# Patient Record
Sex: FEMALE | Race: WHITE | NOT HISPANIC OR LATINO | Employment: PART TIME | URBAN - METROPOLITAN AREA
[De-identification: names, ages, dates, MRNs, and addresses within clinical notes are randomized per-mention and may not be internally consistent; named-entity substitution may affect disease eponyms.]

---

## 2018-10-08 ENCOUNTER — EVALUATION (OUTPATIENT)
Dept: PHYSICAL THERAPY | Facility: CLINIC | Age: 43
End: 2018-10-08
Payer: COMMERCIAL

## 2018-10-08 DIAGNOSIS — M54.16 RADICULOPATHY, LUMBAR REGION: Primary | ICD-10-CM

## 2018-10-08 PROCEDURE — 97161 PT EVAL LOW COMPLEX 20 MIN: CPT

## 2018-10-08 PROCEDURE — G8978 MOBILITY CURRENT STATUS: HCPCS

## 2018-10-08 PROCEDURE — G8979 MOBILITY GOAL STATUS: HCPCS

## 2018-10-08 NOTE — LETTER
2018    Clyde Rutledge MD  [address] [place] [id number]    Patient: Leticia Posey   YOB: 1975   Date of Visit: 10/8/2018     Encounter Diagnosis     ICD-10-CM    1  Radiculopathy, lumbar region M54 16        Dear Dr Marino Scales:    Please review the attached Plan of Care from Suzi Sexton's recent visit  Please verify that you agree therapy should continue by signing the attached document and sending it back to our office  If you have any questions or concerns, please don't hesitate to call  Sincerely,    Romy Arboleda, PT      Referring Provider:      I certify that I have read the below Plan of Care and certify the need for these services furnished under this plan of treatment while under my care  Clyde Rutledge MD  [address] [place] [id number]  VIA Facsimile: [phone]          PT Evaluation     Today's date: 10/8/2018  Patient name: Leticia Posey  : 1975  MRN: [id number]  Referring provider: Sara Forbes MD  Dx:   Encounter Diagnosis     ICD-10-CM    1  Radiculopathy, lumbar region M54 16        Start Time: 1130  Stop Time: 1215  Total time in clinic (min): 45 minutes    Assessment  Impairments: abnormal muscle firing, abnormal or restricted ROM, activity intolerance, impaired physical strength, lacks appropriate home exercise program, pain with function, poor posture  and poor body mechanics  Functional limitations: Prolonged sitting/driving, forward bending/lifting  Assessment details: Leticia Posey is a 37 y o  female who presents with pain, decreased strength, decreased ROM, decreased joint mobility and postural  dysfunction  Due to these impairments, patient has difficulty performing ADL's, recreational activities, lifting/carrying, transfers  Patient's clinical presentation is consistent with their referring diagnosis of Radiculopathy, lumbar region  (primary encounter diagnosis)   Patient has been educated in home exercise program and plan of care  Patient would benefit from skilled physical therapy services to address their aforementioned functional limitations and progress towards prior level of function and independence with home exercise program      Understanding of Dx/Px/POC: good   Prognosis: good    Goals  Short Term Goals: Target Date 10/29/18  1  Initiate and advance HEP  2  Increase trunk flexion AROM to </= 25% limitation  3  Increase bilateral LE MMT by at least 1/2 grade  4  Increase sitting tolerance to 20 min without increased symptoms  5  Pt will be able to squat to approx 90 deg knee flex using proper form with minimal PT cuing            Long Term Goals: Target Date 11/19/18  1  Indep with HEP  2  Increase trunk flexion AROM to WNL  3  Increase bilateral LE MMT to at least 4+/5 in all planes  4  Increase sitting tolerance to >30 min without increased symptoms  5  Pt will be able to demonstrate a full functional squat without PT cuing  6   Pt will be able to return to PLOF without limitation      Plan  Patient would benefit from: skilled PT  Planned modality interventions: cryotherapy, electrical stimulation/Russian stimulation and thermotherapy: hydrocollator packs  Planned therapy interventions: joint mobilization, manual therapy, patient education, postural training, activity modification, abdominal trunk stabilization, body mechanics training, flexibility, functional ROM exercises, graded exercise, home exercise program, neuromuscular re-education, strengthening, stretching, therapeutic activities, therapeutic exercise, motor coordination training, muscle pump exercises, gait training, balance/weight bearing training and ADL training  Frequency: 2x week  Duration in weeks: 6  Plan of Care beginning date: 10/8/2018  Plan of Care expiration date: 11/19/2018  Treatment plan discussed with: patient        Subjective Evaluation    History of Present Illness  Mechanism of injury: Pt reports of an approx 1 year history of R sided sciatica that she experiences with prolonged sitting, escpecially while driving  States she has similar symptoms once when pregnant and again a few years ago, but that they had resolved on their own  She reports over the summer, she began experiencing L sided LBP that would prevent her from standing upright and ambulating community distances  These symptoms remains after a few weeks, causing her to see the MD who performed x-rays (neg other than increased lumbar lordosis) and prescribed PT  Pt reports during that time, she began stretching and has noticed a marked improvement in L LBP, although the R sciatica remains  Quality of life: good    Pain  Current pain rating: 3  At best pain ratin  At worst pain ratin  Location: R glute (Can radiate to the ankle)  Quality: dull ache, radiating and sharp  Relieving factors: medications (Standing)  Aggravating factors: sitting (Driving, forward bending/lifting)  Progression: no change      Diagnostic Tests  X-ray: abnormal (Slight increased lordosis)  Treatments  Current treatment: physical therapy  Patient Goals  Patient goals for therapy: independence with ADLs/IADLs and decreased pain  Patient goal: Return to exercise classes        Objective     Special Questions  Negative for bladder dysfunction and bowel dysfunction    Static Posture     Lumbar Spine   Increased lordosis  Palpation   Left   Hypertonic in the piriformis  Tenderness of the gluteus rhonda and piriformis  Right   Hypertonic in the piriformis and quadratus lumborum  Tenderness of the gluteus rhonda, piriformis and quadratus lumborum  Tenderness     Left Hip   Tenderness in the PSIS  Right Hip   Tenderness in the PSIS       Neurological Testing     Sensation     Lumbar   Left   Intact: light touch    Right   Intact: light touch    Active Range of Motion     Additional Active Range of Motion Details  Lumbar  Flex: Fingertips to mid tibia, increase stretch/tension  Ext: WFL, no change  R Lat Flex: WFL, L sided tightness  L Lateral Flex: WFL, R sided tightness  R Rotation: WFL, L sided tightness  L Rotation: 25% limitation, increased SIJ soreness      Passive Range of Motion   Left Hip   Flexion: [facility] [place]  External rotation (90/90): 75 degrees   Internal rotation (90/90): [facility] [place]  Internal rotation (prone): WFL    Right Hip   Flexion: [facility] [place]  External rotation (90/90): 50 degrees with pain  Internal rotation (90/90): [facility] [place]  Internal rotation (prone): [facility]    Strength/Myotome Testing     Left Hip   Planes of Motion   Flexion: 4  Extension: 4+  Abduction: 4-  Adduction: 4    Right Hip   Planes of Motion   Flexion: 4  Extension: 4+  Abduction: 4-  Adduction: 4    Left Knee   Flexion: 4+  Extension: 5    Right Knee   Flexion: 4+  Extension: 5    Left Ankle/Foot   Normal strength    Right Ankle/Foot   Normal strength    Muscle Activation   Patient able to activate left transverse abdominals and right transverse abdominals  Tests     Lumbar     Left   Positive crossed SLR  Right   Positive crossed SLR  Left Hip   Modified Alex: Positive  90/90 SLR: Positive  Right Hip   Modified Alex: Positive  90/90 SLR: Positive  Ambulation     Ambulation: Stairs   Pattern: reciprocal  Pattern: reciprocal    Observational Gait   Gait: within functional limits     Additional Observational Gait Details  Pt ambulates with a mostly normalized gait pattern other than slight decrease in ann marie trunk/hip rotation  Denies c/o    Functional Assessment     Single Leg Stance   Left: 30 seconds  Right: 30 seconds    Comments  Squat:   Pt is able to squat to approx 105 deg knee flexion demonstrating increased trunk flexion and a narrow BEENA    Pt denies c/o      Flowsheet Rows      Most Recent Value   PT/OT G-Codes   Current Score  34   Projected Score  59   Assessment Type  Evaluation   G code set  Mobility: Walking & Moving Around   Mobility: Walking and Moving Around Current Status ()  CL   Mobility: Walking and Moving Around Goal Status ()  CK          Precautions: [id number]    Specialty Daily Treatment Diary       Manual 10/8/18       STM        L/S p-a mobs        Man Flexibility        MET                        Exercise Diary         Rec bike        TA activation 10x5"       Ball squeeze 10x5"       SLR abd x10 ann marie       Sciatic nerve glides Supine 10x10" ann marie       Bridges 15x5"       Piriformis stretch 3x30" ann marie                                                                                       Modalities

## 2018-10-08 NOTE — PROGRESS NOTES
PT Evaluation     Today's date: 10/8/2018  Patient name: Celeste Carson  : 1975  MRN: [id number]  Referring provider: Mikey Johnson MD  Dx:   Encounter Diagnosis     ICD-10-CM    1  Radiculopathy, lumbar region M54 16        Start Time: 1130  Stop Time: 1215  Total time in clinic (min): 45 minutes    Assessment  Impairments: abnormal muscle firing, abnormal or restricted ROM, activity intolerance, impaired physical strength, lacks appropriate home exercise program, pain with function, poor posture  and poor body mechanics  Functional limitations: Prolonged sitting/driving, forward bending/lifting  Assessment details: Celeste Carson is a 37 y o  female who presents with pain, decreased strength, decreased ROM, decreased joint mobility and postural  dysfunction  Due to these impairments, patient has difficulty performing ADL's, recreational activities, lifting/carrying, transfers  Patient's clinical presentation is consistent with their referring diagnosis of Radiculopathy, lumbar region  (primary encounter diagnosis)  Patient has been educated in home exercise program and plan of care  Patient would benefit from skilled physical therapy services to address their aforementioned functional limitations and progress towards prior level of function and independence with home exercise program      Understanding of Dx/Px/POC: good   Prognosis: good    Goals  Short Term Goals: Target Date 10/29/18  1  Initiate and advance HEP  2  Increase trunk flexion AROM to </= 25% limitation  3  Increase bilateral LE MMT by at least 1/2 grade  4  Increase sitting tolerance to 20 min without increased symptoms  5  Pt will be able to squat to approx 90 deg knee flex using proper form with minimal PT cuing            Long Term Goals: Target Date 18  1  Indep with HEP  2  Increase trunk flexion AROM to WNL  3  Increase bilateral LE MMT to at least 4+/5 in all planes  4  Increase sitting tolerance to >30 min without increased symptoms  5  Pt will be able to demonstrate a full functional squat without PT cuing  6  Pt will be able to return to PLOF without limitation      Plan  Patient would benefit from: skilled PT  Planned modality interventions: cryotherapy, electrical stimulation/Russian stimulation and thermotherapy: hydrocollator packs  Planned therapy interventions: joint mobilization, manual therapy, patient education, postural training, activity modification, abdominal trunk stabilization, body mechanics training, flexibility, functional ROM exercises, graded exercise, home exercise program, neuromuscular re-education, strengthening, stretching, therapeutic activities, therapeutic exercise, motor coordination training, muscle pump exercises, gait training, balance/weight bearing training and ADL training  Frequency: 2x week  Duration in weeks: 6  Plan of Care beginning date: 10/8/2018  Plan of Care expiration date: 2018  Treatment plan discussed with: patient        Subjective Evaluation    History of Present Illness  Mechanism of injury: Pt reports of an approx 1 year history of R sided sciatica that she experiences with prolonged sitting, escpecially while driving  States she has similar symptoms once when pregnant and again a few years ago, but that they had resolved on their own  She reports over the summer, she began experiencing L sided LBP that would prevent her from standing upright and ambulating community distances  These symptoms remains after a few weeks, causing her to see the MD who performed x-rays (neg other than increased lumbar lordosis) and prescribed PT  Pt reports during that time, she began stretching and has noticed a marked improvement in L LBP, although the R sciatica remains      Quality of life: good    Pain  Current pain rating: 3  At best pain ratin  At worst pain ratin  Location: R glute (Can radiate to the ankle)  Quality: dull ache, radiating and sharp  Relieving factors: medications (Standing)  Aggravating factors: sitting (Driving, forward bending/lifting)  Progression: no change      Diagnostic Tests  X-ray: abnormal (Slight increased lordosis)  Treatments  Current treatment: physical therapy  Patient Goals  Patient goals for therapy: independence with ADLs/IADLs and decreased pain  Patient goal: Return to exercise classes        Objective     Special Questions  Negative for bladder dysfunction and bowel dysfunction    Static Posture     Lumbar Spine   Increased lordosis  Palpation   Left   Hypertonic in the piriformis  Tenderness of the gluteus rhonda and piriformis  Right   Hypertonic in the piriformis and quadratus lumborum  Tenderness of the gluteus rhonda, piriformis and quadratus lumborum  Tenderness     Left Hip   Tenderness in the PSIS  Right Hip   Tenderness in the PSIS  Neurological Testing     Sensation     Lumbar   Left   Intact: light touch    Right   Intact: light touch    Active Range of Motion     Additional Active Range of Motion Details  Lumbar  Flex: Fingertips to mid tibia, increase stretch/tension  Ext: WFL, no change  R Lat Flex: WFL, L sided tightness  L Lateral Flex: WFL, R sided tightness  R Rotation: WFL, L sided tightness  L Rotation: 25% limitation, increased SIJ soreness      Passive Range of Motion   Left Hip   Flexion: [facility] [place]  External rotation (90/90): 75 degrees   Internal rotation (90/90): [facility] [place]  Internal rotation (prone): WFL    Right Hip   Flexion: [facility] [place]  External rotation (90/90): 50 degrees with pain  Internal rotation (90/90):  [facility] [place]  [facility]    Strength/Myotome Testing     Left Hip   Planes of Motion   Flexion: 4  Extension: 4+  Abduction: 4-  Adduction: 4    Right Hip   Planes of Motion   Flexion: 4  Extension: 4+  Abduction: 4-  Adduction: 4    Left Knee   Flexion: 4+  Extension: 5    Right Knee   Flexion: 4+  Extension: 5    Left Ankle/Foot   Normal strength    Right Ankle/Foot   Normal strength    Muscle Activation   Patient able to activate left transverse abdominals and right transverse abdominals  Tests     Lumbar     Left   Positive crossed SLR  Right   Positive crossed SLR  Left Hip   Modified Aelx: Positive  90/90 SLR: Positive  Right Hip   Modified Alex: Positive  90/90 SLR: Positive  Ambulation     Ambulation: Stairs   Pattern: reciprocal  Pattern: reciprocal    Observational Gait   Gait: within functional limits     Additional Observational Gait Details  Pt ambulates with a mostly normalized gait pattern other than slight decrease in ann marie trunk/hip rotation  Denies c/o    Functional Assessment     Single Leg Stance   Left: 30 seconds  Right: 30 seconds    Comments  Squat:   Pt is able to squat to approx 105 deg knee flexion demonstrating increased trunk flexion and a narrow BEENA    Pt denies c/o      Flowsheet Rows      Most Recent Value   PT/OT G-Codes   Current Score  34   Projected Score  59   Assessment Type  Evaluation   G code set  Mobility: Walking & Moving Around   Mobility: Walking and Moving Around Current Status ()  CL   Mobility: Walking and Moving Around Goal Status ()  CK          Precautions: [id number]/8    Specialty Daily Treatment Diary       Manual 10/8/18       STM        L/S p-a mobs        Man Flexibility        MET                        Exercise Diary         Rec bike        TA activation 10x5"       Ball squeeze 10x5"       SLR abd x10 ann marie       Sciatic nerve glides Supine 10x10" ann marie       Bridges 15x5"       Piriformis stretch 3x30" ann marie                                                                                       Modalities

## 2018-10-09 ENCOUNTER — TRANSCRIBE ORDERS (OUTPATIENT)
Dept: PHYSICAL THERAPY | Facility: CLINIC | Age: 43
End: 2018-10-09

## 2018-10-09 DIAGNOSIS — M54.16 LUMBAR RADICULOPATHY: Primary | ICD-10-CM

## 2018-10-15 ENCOUNTER — OFFICE VISIT (OUTPATIENT)
Dept: PHYSICAL THERAPY | Facility: CLINIC | Age: 43
End: 2018-10-15
Payer: COMMERCIAL

## 2018-10-15 DIAGNOSIS — M54.16 RADICULOPATHY, LUMBAR REGION: Primary | ICD-10-CM

## 2018-10-15 PROCEDURE — 97140 MANUAL THERAPY 1/> REGIONS: CPT

## 2018-10-15 PROCEDURE — 97112 NEUROMUSCULAR REEDUCATION: CPT

## 2018-10-15 PROCEDURE — 97110 THERAPEUTIC EXERCISES: CPT

## 2018-10-15 NOTE — PROGRESS NOTES
Daily Note     Today's date: 10/15/2018  Patient name: Kingsley Molina  : 1975  MRN: [id number]  Referring provider: Nenita Mims MD  Dx:   Encounter Diagnosis   Name Primary?  Radiculopathy, lumbar region Yes       Start Time: 1045  Stop Time: 1135  Total time in clinic (min): 50 minutes    Subjective: Pt reports of mild low back soreness over the weekend  Has light sciatic nerve paresthesia in R LE with prolonged driving  States she did not perform HEP over the weekend  Pain Ratin/10 with prolonged sitting    Objective: See treatment diary below  Precautions: Standard    Specialty Daily Treatment Diary       Manual 10/15/18       STM Ced piriformis/ glute med       L/S p-a mobs        Man Flexibility Rectus fem/ piriformis       MET        PROM  R Hip ER/IR               Exercise Diary         Rec bike        TA activation Sahrmann L1 2x10 ced       Ball squeeze 20x5"       Standing hip abd 20x5" ced       Sciatic nerve glides Seated 20x5"       Bridges 15x5"/on PB x15       Piriformis stretch        Clamshells 20x3" ced red       Wall squats 10x5" w/abd red                                                                       Modalities                            Assessment: Pt challenged by TA activation actiivty  Reports of TTP and paresthesia over the R piriformis  Decreased nerve tension reported with slump  Pt becoming aware of compensation for weak hip abductors  Plan: Continue per plan of care  Progress treatment as tolerated

## 2018-10-17 ENCOUNTER — APPOINTMENT (OUTPATIENT)
Dept: PHYSICAL THERAPY | Facility: CLINIC | Age: 43
End: 2018-10-17
Payer: COMMERCIAL

## 2018-10-22 ENCOUNTER — OFFICE VISIT (OUTPATIENT)
Dept: PHYSICAL THERAPY | Facility: CLINIC | Age: 43
End: 2018-10-22
Payer: COMMERCIAL

## 2018-10-22 DIAGNOSIS — M54.16 RADICULOPATHY, LUMBAR REGION: Primary | ICD-10-CM

## 2018-10-22 PROCEDURE — 97140 MANUAL THERAPY 1/> REGIONS: CPT

## 2018-10-22 PROCEDURE — 97110 THERAPEUTIC EXERCISES: CPT

## 2018-10-22 NOTE — PROGRESS NOTES
Daily Note     Today's date: 10/22/2018  Patient name: Velia William  : 1975  MRN: [id number]  Referring provider: Daina Baxter MD  Dx:   Encounter Diagnosis   Name Primary?  Radiculopathy, lumbar region Yes       Start Time: 1045  Stop Time: 1130  Total time in clinic (min): 45 minutes    Subjective: Pt reports her back is feeling much better, but has continued sciatic symptoms with prolonged sitting  Pain Ratin/10    Objective: See treatment diary below  Precautions: Standard    Specialty Daily Treatment Diary       Manual 10/15/18 10/22/18      STM Ann Marie piriformis/ glute med Ann Marie piriformis/ glute med      L/S p-a mobs        Man Flexibility Rectus fem/ piriformis Rectus fem /piriformis/HS      MET        PROM  R Hip ER/IR R hip ER/IR              Exercise Diary         Rec bike        TA activation Sahrmann L1 2x10 ann marie Sahrmann L2 2x10 ann marie      Ball squeeze 20x5" 10x5"/feet elevated 10x5"      Standing hip abd 20x5" ann marie s/l10x5" ann marie      Sciatic nerve glides Seated 20x5" Seated 20x5"      Bridges 15x5"/on PB x15 15x5"/on PB x15      Piriformis stretch        Clamshells 20x3" ann marie red 20x3" ann marie red      Wall squats 10x5" w/abd red 20x5" w/abd red      Lat band walks  6x25 ft red      Paloff press  5x5" blue                                                      Modalities                            Assessment: Pt challeneged with lat band walks, added to HEP  Denies symptoms with all exercise, slight symptoms into the foot with piriformis stretch  Plan: Continue per plan of care  Progress treatment as tolerated  Velia William was attending physical therapy secondary to   1  Radiculopathy, lumbar region        Patient was seen for 3 Physical Therapy visits between 10/8 and 10/22/18  Suzi canceled or no showed for her remaining visits and did not return calls made to her  Were unable to leave voicemail as her mailbox was full     Patient is to be discharged from skilled physical therapy services at this time secondary to interruption of care  See Initial Evaluation from 10/8/18 for most recent objective measurements

## 2018-10-24 ENCOUNTER — APPOINTMENT (OUTPATIENT)
Dept: PHYSICAL THERAPY | Facility: CLINIC | Age: 43
End: 2018-10-24
Payer: COMMERCIAL

## 2018-10-29 ENCOUNTER — APPOINTMENT (OUTPATIENT)
Dept: PHYSICAL THERAPY | Facility: CLINIC | Age: 43
End: 2018-10-29
Payer: COMMERCIAL